# Patient Record
Sex: MALE | Race: WHITE | NOT HISPANIC OR LATINO | Employment: FULL TIME | URBAN - METROPOLITAN AREA
[De-identification: names, ages, dates, MRNs, and addresses within clinical notes are randomized per-mention and may not be internally consistent; named-entity substitution may affect disease eponyms.]

---

## 2023-05-31 LAB
ESTRADIOL (PG/ML) IN SER/PLAS: 25 PG/ML
FOLLITROPIN (IU/L) IN SER/PLAS: <0.9 IU/L
HEMATOCRIT (%) IN BLOOD BY AUTOMATED COUNT: 47.8 % (ref 41–52)
LUTEINIZING HORMONE (IU/ML) IN SER/PLAS: 0.1 IU/L
PROLACTIN (UG/L) IN SER/PLAS: 6.3 UG/L (ref 2–18)
PROSTATE SPECIFIC AG (NG/ML) IN SER/PLAS: 0.92 NG/ML (ref 0–4)
TESTOSTERONE (NG/DL) IN SER/PLAS: 200 NG/DL (ref 240–1000)

## 2023-06-05 LAB — 17-HYDROXYPROGESTERONE (REFLAB): 34.18 NG/DL

## 2023-10-13 PROBLEM — E29.1 HYPOGONADISM MALE: Status: ACTIVE | Noted: 2023-10-13

## 2023-10-13 RX ORDER — SYRINGE W-NEEDLE,DISPOSAB,3 ML 25GX5/8"
SYRINGE, EMPTY DISPOSABLE MISCELLANEOUS
COMMUNITY
End: 2023-10-16 | Stop reason: SDUPTHER

## 2023-10-13 RX ORDER — TESTOSTERONE CYPIONATE 200 MG/ML
200 INJECTION, SOLUTION INTRAMUSCULAR
COMMUNITY
Start: 2023-03-19 | End: 2023-10-16 | Stop reason: SDUPTHER

## 2023-10-15 PROBLEM — Z71.2 ENCOUNTER TO DISCUSS TEST RESULTS: Status: ACTIVE | Noted: 2023-10-15

## 2023-10-15 PROBLEM — Z09 ENCOUNTER FOR FOLLOW-UP: Status: ACTIVE | Noted: 2023-10-15

## 2023-10-15 NOTE — PROGRESS NOTES
"HPI  43 yo male patient here for concern for hypogonadism  Patient states he sees Dr. Gómez E/O week for injections currently but d/t schedule conflicts he is not able to get the injections in a timely manner.  Pt has been doing testosterone injections for 6 months once every 2 weeks.      Last Visit 05/30/23:  -hypogonadism counseling  -full hypogonadal panel.   -Start TC 0.5 cc weekly.   -Injection teaching done today.   -PSA     Todays visit:  #Hypogonadism  -TC 0.5 cc weekly -->doing well    SYMPTOMS:  -Duration: 6 months.   -Decreased libido: none  -Decreased energy: Yes, better since starting testosterone.   -Decreased muscle mass: None.   -Decreased erections: None.      -Interested in fertility preservation: No.      HISTORY:  -Previous gynecomastia: none  -Personal or family history of prostate cancer: none  -Previous use of testosterone or anabolic steroids: none  History of heart attack or stroke: none     10/18 T 280  01/20 T 335  01/26 T 336  02/25 T 222     Lab Results   Component Value Date    TESTOSTERONE 200 (L) 05/31/2023     Lab Results   Component Value Date    LH 0.1 05/31/2023     Lab Results   Component Value Date    FSH <0.9 05/31/2023     Lab Results   Component Value Date    ESTRADIOL 25 05/31/2023     Lab Results   Component Value Date    PSA 0.92 05/31/2023     Lab Results   Component Value Date    PROLACTIN 6.3 05/31/2023     Lab Results   Component Value Date    17HYDROXYPRO 34.18 05/31/2023     Lab Results   Component Value Date    HCT 47.8 05/31/2023     Current Medications:  Current Outpatient Medications   Medication Sig Dispense Refill    needle, disp, 18 G 18 gauge x 1 1/2\" needle       syringe with needle (Carepoint Luer Lock Syr-needle) 3 mL 22 x 1 1/2\" syringe       testosterone cypionate (Depo-Testosterone) 200 mg/mL injection Inject 1 mL (200 mg) into the muscle 1 (one) time per week.       No current facility-administered medications for this visit.      PMH:  No past " medical history on file.    PSH:  No past surgical history on file.    FMH:  No family history on file.    Allergies:  No Known Allergies    Physical Exam   NAD  Nonlabored breathing  No gynecomastia    Assesment/Plan       #Hypogonadism   -continue TC 0.5 cc weekly, refilled today.  -low t fu labs today     #Prostate cancer screening   -PSA wnl     Low T fu labs today      Follow up in 6 mo with low t labs prior.    Scribe Attestation  By signing my name below, I, My Miguel , Scrib[place]ille   attest that this documentation has been prepared under the direction and in the presence of Starr Regalado MD.

## 2023-10-16 ENCOUNTER — OFFICE VISIT (OUTPATIENT)
Dept: UROLOGY | Facility: HOSPITAL | Age: 43
End: 2023-10-16
Payer: COMMERCIAL

## 2023-10-16 ENCOUNTER — LAB (OUTPATIENT)
Dept: LAB | Facility: LAB | Age: 43
End: 2023-10-16
Payer: COMMERCIAL

## 2023-10-16 DIAGNOSIS — E29.1 HYPOGONADISM IN MALE: ICD-10-CM

## 2023-10-16 DIAGNOSIS — Z12.5 PROSTATE CANCER SCREENING: ICD-10-CM

## 2023-10-16 DIAGNOSIS — E29.1 HYPOGONADISM MALE: ICD-10-CM

## 2023-10-16 DIAGNOSIS — Z71.2 ENCOUNTER TO DISCUSS TEST RESULTS: ICD-10-CM

## 2023-10-16 DIAGNOSIS — Z09 ENCOUNTER FOR FOLLOW-UP: Primary | ICD-10-CM

## 2023-10-16 DIAGNOSIS — E29.1 HYPOGONADISM MALE: Primary | ICD-10-CM

## 2023-10-16 LAB
ESTRADIOL SERPL-MCNC: 50 PG/ML
HCT VFR BLD AUTO: 46.2 % (ref 41–52)
LH SERPL-ACNC: <0.2 IU/L

## 2023-10-16 PROCEDURE — 83002 ASSAY OF GONADOTROPIN (LH): CPT

## 2023-10-16 PROCEDURE — 84153 ASSAY OF PSA TOTAL: CPT

## 2023-10-16 PROCEDURE — 85014 HEMATOCRIT: CPT

## 2023-10-16 PROCEDURE — 82670 ASSAY OF TOTAL ESTRADIOL: CPT

## 2023-10-16 PROCEDURE — 99214 OFFICE O/P EST MOD 30 MIN: CPT | Performed by: UROLOGY

## 2023-10-16 PROCEDURE — 36415 COLL VENOUS BLD VENIPUNCTURE: CPT

## 2023-10-16 PROCEDURE — 84403 ASSAY OF TOTAL TESTOSTERONE: CPT

## 2023-10-16 RX ORDER — SYRINGE W-NEEDLE,DISPOSAB,3 ML 25GX5/8"
1 SYRINGE, EMPTY DISPOSABLE MISCELLANEOUS
Qty: 16 EACH | Refills: 2 | Status: SHIPPED | OUTPATIENT
Start: 2023-10-16

## 2023-10-16 RX ORDER — TESTOSTERONE CYPIONATE 200 MG/ML
200 INJECTION, SOLUTION INTRAMUSCULAR
Qty: 4 ML | Refills: 5 | Status: SHIPPED | OUTPATIENT
Start: 2023-10-16 | End: 2023-10-16 | Stop reason: SDUPTHER

## 2023-10-16 RX ORDER — TESTOSTERONE CYPIONATE 200 MG/ML
200 INJECTION, SOLUTION INTRAMUSCULAR
Qty: 4 ML | Refills: 5 | Status: SHIPPED | OUTPATIENT
Start: 2023-10-16 | End: 2023-10-18 | Stop reason: SDUPTHER

## 2023-10-17 LAB
PSA SERPL-MCNC: 0.79 NG/ML
TESTOST SERPL-MCNC: 707 NG/DL (ref 240–1000)

## 2023-10-18 DIAGNOSIS — E29.1 HYPOGONADISM MALE: ICD-10-CM

## 2023-10-18 RX ORDER — TESTOSTERONE CYPIONATE 200 MG/ML
100 INJECTION, SOLUTION INTRAMUSCULAR
Qty: 4 ML | Refills: 5 | Status: SHIPPED | OUTPATIENT
Start: 2023-10-18 | End: 2024-04-22 | Stop reason: SDUPTHER

## 2024-04-18 ENCOUNTER — LAB (OUTPATIENT)
Dept: LAB | Facility: LAB | Age: 44
End: 2024-04-18
Payer: COMMERCIAL

## 2024-04-18 DIAGNOSIS — Z12.5 PROSTATE CANCER SCREENING: ICD-10-CM

## 2024-04-18 DIAGNOSIS — E29.1 HYPOGONADISM IN MALE: ICD-10-CM

## 2024-04-18 LAB
ESTRADIOL SERPL-MCNC: 36 PG/ML
HCT VFR BLD AUTO: 48.4 % (ref 41–52)
LH SERPL-ACNC: <0.1 IU/L
PSA SERPL-MCNC: 0.97 NG/ML
TESTOST SERPL-MCNC: 448 NG/DL (ref 240–1000)

## 2024-04-18 PROCEDURE — 36415 COLL VENOUS BLD VENIPUNCTURE: CPT

## 2024-04-18 PROCEDURE — 84153 ASSAY OF PSA TOTAL: CPT

## 2024-04-18 PROCEDURE — 85014 HEMATOCRIT: CPT

## 2024-04-18 PROCEDURE — 82670 ASSAY OF TOTAL ESTRADIOL: CPT

## 2024-04-18 PROCEDURE — 84403 ASSAY OF TOTAL TESTOSTERONE: CPT

## 2024-04-18 PROCEDURE — 83002 ASSAY OF GONADOTROPIN (LH): CPT

## 2024-04-22 ENCOUNTER — OFFICE VISIT (OUTPATIENT)
Dept: UROLOGY | Facility: HOSPITAL | Age: 44
End: 2024-04-22
Payer: COMMERCIAL

## 2024-04-22 DIAGNOSIS — E29.1 HYPOGONADISM MALE: ICD-10-CM

## 2024-04-22 DIAGNOSIS — Z12.5 PROSTATE CANCER SCREENING: Primary | ICD-10-CM

## 2024-04-22 PROCEDURE — 99214 OFFICE O/P EST MOD 30 MIN: CPT | Performed by: UROLOGY

## 2024-04-22 PROCEDURE — G2211 COMPLEX E/M VISIT ADD ON: HCPCS | Performed by: UROLOGY

## 2024-04-22 RX ORDER — TESTOSTERONE CYPIONATE 200 MG/ML
100 INJECTION, SOLUTION INTRAMUSCULAR
Qty: 4 ML | Refills: 5 | Status: SHIPPED | OUTPATIENT
Start: 2024-04-22

## 2024-04-22 NOTE — PROGRESS NOTES
"Last visit 10/16/2023:  -continue TC .5cc weekly  -psa    Today's visit:  42 yo male patient here for concern for hypogonadism  Was seeing Dr. Gómez E/O week for injections     #Hypogonadism  -TC 0.5 cc weekly -->doing well      10/18 T 280  01/20 T 335  01/26 T 336  02/25 T 222       Labs  Component      Latest Ref Rng 4/18/2024   LH      IU/L <0.1    Estradiol      pg/mL 36    PSA      <=4.00 ng/mL 0.97    Testosterone      240 - 1,000 ng/dL 448    HEMATOCRIT      41.0 - 52.0 % 48.4          Lab Results   Component Value Date    TESTOSTERONE 448 04/18/2024    TESTOSTERONE 707 10/16/2023    TESTOSTERONE 200 (L) 05/31/2023     Lab Results   Component Value Date    LH <0.1 04/18/2024     Lab Results   Component Value Date    FSH <0.9 05/31/2023     No components found for: \"ESTRADIAL\"  Lab Results   Component Value Date    PSA 0.97 04/18/2024     No components found for: \"CBC\"  Lab Results   Component Value Date    PROLACTIN 6.3 05/31/2023     No results found for: \"HGBA1C\"  No components found for: \"HEMATOCRIT\"      Medications:    Current Outpatient Medications:     needle, disp, 18 G 18 gauge x 1 1/2\" needle, Use for medication draw only, Disp: 16 each, Rfl: 3    syringe with needle (Carepoint Luer Lock Syr-needle) 3 mL 22 x 1 1/2\" syringe, 1 mL 1 (one) time per week. Use for testosterone injection, Disp: 16 each, Rfl: 2    testosterone cypionate (Depo-Testosterone) 200 mg/mL injection, Inject 0.5 mL (100 mg) into the muscle 1 (one) time per week., Disp: 4 mL, Rfl: 5    Allergy:  No Known Allergies     Exam  CONSTITUTIONAL:        No acute distress    HEAD:        Normocephalic and atraumatic    CHEST / RESPIRATORY      no excess work of breathing, no respiratory distress,    ABDOMEN / GASTROINTESTINAL:        Abdomen nondistended          Assessment/Plan     #Hypogonadism   -continue TC 0.5 cc weekly, refilled today.     #Prostate cancer screening   -PSA wnl  G 2211  Visit complexity inherent to evaluation and " management (E&M) associated with medical care services that serve as the continuing focal point for all needed health care services and/or with medical care services that are part of ongoing care related to a patient's single, serious condition or a complex condition.      Fuv in 6 months with low T fu labs prior

## 2024-10-23 ENCOUNTER — LAB (OUTPATIENT)
Dept: LAB | Facility: LAB | Age: 44
End: 2024-10-23
Payer: COMMERCIAL

## 2024-10-23 DIAGNOSIS — E29.1 HYPOGONADISM MALE: ICD-10-CM

## 2024-10-23 DIAGNOSIS — E29.1 HYPOGONADISM IN MALE: ICD-10-CM

## 2024-10-23 DIAGNOSIS — R79.89 LOW TESTOSTERONE: ICD-10-CM

## 2024-10-23 DIAGNOSIS — Z12.5 PROSTATE CANCER SCREENING: ICD-10-CM

## 2024-10-23 LAB
HCT VFR BLD AUTO: 51 % (ref 41–52)
LH SERPL-ACNC: 0.2 IU/L
PROLACTIN SERPL-MCNC: 5.7 UG/L (ref 2–18)
PSA SERPL-MCNC: 0.98 NG/ML
TESTOST SERPL-MCNC: 890 NG/DL (ref 240–1000)

## 2024-10-23 PROCEDURE — 84146 ASSAY OF PROLACTIN: CPT

## 2024-10-23 PROCEDURE — 36415 COLL VENOUS BLD VENIPUNCTURE: CPT

## 2024-10-23 PROCEDURE — 84403 ASSAY OF TOTAL TESTOSTERONE: CPT

## 2024-10-23 PROCEDURE — 84153 ASSAY OF PSA TOTAL: CPT

## 2024-10-23 PROCEDURE — 85014 HEMATOCRIT: CPT

## 2024-10-23 PROCEDURE — 83002 ASSAY OF GONADOTROPIN (LH): CPT

## 2024-10-28 ENCOUNTER — APPOINTMENT (OUTPATIENT)
Dept: UROLOGY | Facility: HOSPITAL | Age: 44
End: 2024-10-28
Payer: COMMERCIAL

## 2024-10-28 DIAGNOSIS — Z12.5 ENCOUNTER FOR PROSTATE CANCER SCREENING: ICD-10-CM

## 2024-10-28 DIAGNOSIS — N52.9 ERECTILE DYSFUNCTION, UNSPECIFIED ERECTILE DYSFUNCTION TYPE: ICD-10-CM

## 2024-10-28 DIAGNOSIS — E29.1 HYPOGONADISM MALE: Primary | ICD-10-CM

## 2024-10-28 PROCEDURE — 99214 OFFICE O/P EST MOD 30 MIN: CPT | Performed by: UROLOGY

## 2024-10-28 PROCEDURE — G2211 COMPLEX E/M VISIT ADD ON: HCPCS | Performed by: UROLOGY

## 2024-10-28 RX ORDER — TADALAFIL 20 MG/1
20 TABLET ORAL AS NEEDED
Qty: 30 TABLET | Refills: 6 | Status: SHIPPED | OUTPATIENT
Start: 2024-10-28

## 2024-10-29 RX ORDER — SYRINGE W-NEEDLE,DISPOSAB,3 ML 23GX1"
SYRINGE, EMPTY DISPOSABLE MISCELLANEOUS
Qty: 16 EACH | Refills: 3 | Status: SHIPPED | OUTPATIENT
Start: 2024-10-29

## 2024-10-29 RX ORDER — TESTOSTERONE CYPIONATE 200 MG/ML
100 INJECTION, SOLUTION INTRAMUSCULAR
Qty: 5 ML | Refills: 5 | Status: SHIPPED | OUTPATIENT
Start: 2024-10-29

## 2025-04-11 DIAGNOSIS — Z12.5 PROSTATE CANCER SCREENING: ICD-10-CM

## 2025-04-11 DIAGNOSIS — E29.1 HYPOGONADISM IN MALE: ICD-10-CM

## 2025-04-19 LAB
ESTRADIOL SERPL-MCNC: 40 PG/ML
HCT VFR BLD AUTO: 52.9 % (ref 38.5–50)
LH SERPL-ACNC: <0.2 MIU/ML (ref 1.5–9.3)
PSA SERPL-MCNC: 0.93 NG/ML
TESTOST SERPL-MCNC: 644 NG/DL (ref 250–827)

## 2025-05-04 NOTE — PROGRESS NOTES
"Virtual or Telephone Consent    An interactive audio and video telecommunication system which permits real time communications between the patient (at the originating site) and provider (at the distant site) was utilized to provide this telehealth service.   Verbal consent was requested and obtained from Ortiz Hartley on this date, 05/05/25 for a telehealth visit and the patient's location was confirmed at the time of the visit.    Last visit 10/28/24:    #Hypogonadism   -continue TC 0.5 cc weekly, refilled today.   #Prostate cancer screening   -PSA wnl  #Erectile Dysfunction:   Trial of Cialis 20mg   Fu in 6 months with low T fu labs prior     Today's visit:  45 yo male patient here for concern for hypogonadism  Was seeing Dr. Gómez E/O week for injections     #Hypogonadism  -TC 0.5 cc weekly -->doing well  - denies s/e    10/18 T 280  01/20 T 335  01/26 T 336  02/25 T 222     #ED  -Cialis 20mg---> worked well   - trouble with erections and maintaining erections  - non smoker  -denies nitrates      for [employer]jet  Labs  Lab Results   Component Value Date    TESTOSTERONE 644 04/18/2025    TESTOSTERONE 890 10/23/2024    TESTOSTERONE 448 04/18/2024    TESTOSTERONE 707 10/16/2023    TESTOSTERONE 200 (L) 05/31/2023     Lab Results   Component Value Date    LH <0.2 (L) 04/18/2025     Lab Results   Component Value Date    ESTRADIOL 40 (H) 04/18/2025     Lab Results   Component Value Date    PSA 0.93 04/18/2025     Lab Results   Component Value Date    HCT 52.9 (H) 04/18/2025     Lab Results   Component Value Date    PROLACTIN 5.7 10/23/2024     Lab Results   Component Value Date    FSH <0.9 05/31/2023       No results found for: \"HGBA1C\"  No components found for: \"HEMATOCRIT\"      Medications:    Current Outpatient Medications:     needle, disp, 18 G 18 gauge x 1 1/2\" needle, Use for medication draw only, Disp: 16 each, Rfl: 3    needle, disp, 18 G 18 gauge x 1 1/2\" needle, Use for medication draw weekly (testosterone), " "Disp: 16 each, Rfl: 3    syringe with needle (Carepoint Luer Lock Syr-needle) 3 mL 22 x 1 1/2\" syringe, 1 mL 1 (one) time per week. Use for testosterone injection, Disp: 16 each, Rfl: 2    syringe with needle (Monoject Syringe) 3 mL 23 x 1\" syringe, Use for IM injections weekly (testosterone), Disp: 16 each, Rfl: 3    tadalafil (Cialis) 20 mg tablet, Take 1 tablet (20 mg) by mouth if needed for erectile dysfunction (Start with half tab. Take 2 hours prior to wanting erection.If you get an erection over 4 hours, go to the emergency room.)., Disp: 30 tablet, Rfl: 6    testosterone cypionate (Depo-Testosterone) 200 mg/mL injection, Inject 0.5 mL (100 mg) into the muscle 1 (one) time per week., Disp: 4 mL, Rfl: 5    testosterone cypionate (Depo-Testosterone) 200 mg/mL injection, Inject 0.5 mL (100 mg) into the muscle 1 (one) time per week., Disp: 5 mL, Rfl: 5    Allergy:  No Known Allergies     Exam  CONSTITUTIONAL:        No acute distress    HEAD:        Normocephalic and atraumatic    CHEST / RESPIRATORY      no excess work of breathing, no respiratory distress,    ABDOMEN / GASTROINTESTINAL:        Abdomen nondistended          Assessment/Plan     #Hypogonadism   -continue TC 0.5 cc weekly, refilled today.     #Prostate cancer screening   -PSA wnl    #Erectile Dysfunction:    Continue Cialis 20mg    #polycythemia  - will donate blood   -repeat hematocrit in 1 month    I have personally reviewed the [facility] report for this patient I have considered the risks of abuse, dependence, addiction and diversion. I believe that it is clinically appropriate for him to be prescribed this medication.”    G 8366  Visit complexity inherent to evaluation and management (E&M) associated with medical care services that serve as the continuing focal point for all needed health care services and/or with medical care services that are part of ongoing care related to a patient's single, serious condition or a complex condition.      Fu in " 6 months with low T fu labs prior     Scribe Attestation  By signing my name below, I, Ashley Ricky Moore , Scribe   attest that this documentation has been prepared under the direction and in the presence of Starr Regalado MD.

## 2025-05-05 ENCOUNTER — TELEMEDICINE (OUTPATIENT)
Dept: UROLOGY | Facility: HOSPITAL | Age: 45
End: 2025-05-05
Payer: COMMERCIAL

## 2025-05-05 DIAGNOSIS — D75.1 POLYCYTHEMIA: ICD-10-CM

## 2025-05-05 DIAGNOSIS — E29.1 HYPOGONADISM IN MALE: ICD-10-CM

## 2025-05-05 DIAGNOSIS — N52.9 ERECTILE DYSFUNCTION, UNSPECIFIED ERECTILE DYSFUNCTION TYPE: ICD-10-CM

## 2025-05-05 DIAGNOSIS — Z12.5 PROSTATE CANCER SCREENING: ICD-10-CM

## 2025-05-05 DIAGNOSIS — Z12.5 ENCOUNTER FOR PROSTATE CANCER SCREENING: ICD-10-CM

## 2025-05-05 DIAGNOSIS — E29.1 HYPOGONADISM MALE: Primary | ICD-10-CM

## 2025-05-05 PROCEDURE — 99214 OFFICE O/P EST MOD 30 MIN: CPT | Performed by: UROLOGY

## 2025-05-05 PROCEDURE — G2211 COMPLEX E/M VISIT ADD ON: HCPCS | Performed by: UROLOGY

## 2025-05-05 RX ORDER — TESTOSTERONE CYPIONATE 200 MG/ML
100 INJECTION, SOLUTION INTRAMUSCULAR
Qty: 5 ML | Refills: 5 | Status: SHIPPED | OUTPATIENT
Start: 2025-05-11

## 2025-05-05 RX ORDER — SYRINGE W-NEEDLE,DISPOSAB,3 ML 23GX1"
SYRINGE, EMPTY DISPOSABLE MISCELLANEOUS
Qty: 16 EACH | Refills: 3 | Status: SHIPPED | OUTPATIENT
Start: 2025-05-05

## 2025-05-14 DIAGNOSIS — N52.9 ERECTILE DYSFUNCTION, UNSPECIFIED ERECTILE DYSFUNCTION TYPE: ICD-10-CM

## 2025-05-14 RX ORDER — TADALAFIL 20 MG/1
20 TABLET ORAL AS NEEDED
Qty: 30 TABLET | Refills: 6 | Status: SHIPPED | OUTPATIENT
Start: 2025-05-14

## 2025-06-05 DIAGNOSIS — N52.9 ERECTILE DYSFUNCTION, UNSPECIFIED ERECTILE DYSFUNCTION TYPE: ICD-10-CM

## 2025-06-05 DIAGNOSIS — Z12.5 ENCOUNTER FOR PROSTATE CANCER SCREENING: ICD-10-CM

## 2025-06-05 DIAGNOSIS — D75.1 POLYCYTHEMIA: ICD-10-CM

## 2025-06-05 DIAGNOSIS — E29.1 HYPOGONADISM MALE: ICD-10-CM

## 2025-06-07 LAB — HCT VFR BLD AUTO: 49.3 % (ref 38.5–50)

## 2025-11-18 ENCOUNTER — APPOINTMENT (OUTPATIENT)
Dept: UROLOGY | Facility: CLINIC | Age: 45
End: 2025-11-18
Payer: COMMERCIAL